# Patient Record
Sex: FEMALE | Race: WHITE | Employment: UNEMPLOYED | URBAN - METROPOLITAN AREA
[De-identification: names, ages, dates, MRNs, and addresses within clinical notes are randomized per-mention and may not be internally consistent; named-entity substitution may affect disease eponyms.]

---

## 2017-07-06 PROCEDURE — 88175 CYTOPATH C/V AUTO FLUID REDO: CPT | Performed by: OBSTETRICS & GYNECOLOGY

## 2017-07-06 PROCEDURE — 87624 HPV HI-RISK TYP POOLED RSLT: CPT | Performed by: OBSTETRICS & GYNECOLOGY

## 2017-12-29 PROBLEM — J45.41: Status: ACTIVE | Noted: 2017-12-29

## 2018-05-24 PROBLEM — E55.9 VITAMIN D INSUFFICIENCY: Status: ACTIVE | Noted: 2018-05-24

## 2018-08-06 PROBLEM — Z87.410 HISTORY OF CERVICAL DYSPLASIA: Status: ACTIVE | Noted: 2018-08-06

## 2018-08-06 PROCEDURE — 88175 CYTOPATH C/V AUTO FLUID REDO: CPT | Performed by: OBSTETRICS & GYNECOLOGY

## 2018-08-30 PROBLEM — M25.511 CHRONIC RIGHT SHOULDER PAIN: Status: ACTIVE | Noted: 2018-08-30

## 2018-08-30 PROBLEM — G89.29 CHRONIC RIGHT SHOULDER PAIN: Status: ACTIVE | Noted: 2018-08-30

## 2019-09-04 PROCEDURE — 88175 CYTOPATH C/V AUTO FLUID REDO: CPT | Performed by: OBSTETRICS & GYNECOLOGY

## 2020-10-15 PROBLEM — G89.29 CHRONIC RIGHT SHOULDER PAIN: Status: RESOLVED | Noted: 2018-08-30 | Resolved: 2020-10-15

## 2020-10-15 PROBLEM — M25.511 CHRONIC RIGHT SHOULDER PAIN: Status: RESOLVED | Noted: 2018-08-30 | Resolved: 2020-10-15

## 2024-05-31 ENCOUNTER — TELEPHONE (OUTPATIENT)
Dept: ORTHOPEDICS CLINIC | Facility: CLINIC | Age: 44
End: 2024-05-31

## 2024-05-31 DIAGNOSIS — M25.512 LEFT SHOULDER PAIN, UNSPECIFIED CHRONICITY: Primary | ICD-10-CM

## 2024-05-31 NOTE — TELEPHONE ENCOUNTER
Xr ordered for upcoming appointment    Called patient to inform her an Xr order was ordered and scheduled     Patient aware to arrive 15-20 minutes earlier to complete images before seeing the provider     Received no answer   Patient last active 12/2022 on my-cart     Please try reaching out to patient again before appointment date     Xr already scheduled

## 2024-05-31 NOTE — TELEPHONE ENCOUNTER
Pt is seeing Dr. Glasgow for left shoulder pain. Please advise if imaging is needed.  Future Appointments   Date Time Provider Department Center   6/10/2024  1:00 PM Segun Glasgow,  [facility] [address]

## 2024-06-03 NOTE — TELEPHONE ENCOUNTER
LMOM asking patient to contact our office to confirm which appointment she wants to keep and to let her know that xray has been ordered.

## 2024-06-10 ENCOUNTER — OFFICE VISIT (OUTPATIENT)
Facility: CLINIC | Age: 44
End: 2024-06-10
Payer: COMMERCIAL

## 2024-06-10 ENCOUNTER — HOSPITAL ENCOUNTER (OUTPATIENT)
Dept: GENERAL RADIOLOGY | Age: 44
Discharge: HOME | End: 2024-06-10
Attending: FAMILY MEDICINE
Payer: COMMERCIAL

## 2024-06-10 VITALS — BODY MASS INDEX: 19.62 KG/M2 | WEIGHT: 125 LBS | HEIGHT: 67 IN

## 2024-06-10 DIAGNOSIS — M75.102 ROTATOR CUFF SYNDROME OF LEFT SHOULDER: Primary | ICD-10-CM

## 2024-06-10 DIAGNOSIS — M25.512 LEFT SHOULDER PAIN, UNSPECIFIED CHRONICITY: ICD-10-CM

## 2024-06-10 PROCEDURE — 3008F BODY MASS INDEX DOCD: CPT | Performed by: FAMILY MEDICINE

## 2024-06-10 PROCEDURE — 73030 X-RAY EXAM OF SHOULDER: CPT | Performed by: FAMILY MEDICINE

## 2024-06-10 PROCEDURE — 99204 OFFICE O/P NEW MOD 45 MIN: CPT | Performed by: FAMILY MEDICINE

## 2024-06-10 RX ORDER — DICLOFENAC SODIUM 75 MG/1
75 TABLET, DELAYED RELEASE ORAL 2 TIMES DAILY
Qty: 28 TABLET | Refills: 1 | Status: SHIPPED | OUTPATIENT
Start: 2024-06-10

## 2024-06-14 NOTE — H&P
Sports Medicine Clinic Note     Subjective:    Chief Complaint: Left shoulder pain.    History of Present Illness: This is a pleasant 43 year old patient who presents with shoulder pain, left side, with a duration of several months. Reports discomfort and weakness, exacerbated by overhead activities and certain movements.The patient describes a gradual worsening of her pain since around February/March of 2024 when she was doing a lot of free weight training. Pain is localized around the shoulder, worsened by lifting objects, reaching overhead, and during the night. Denies any systemic symptoms, or previous surgeries to this shoulder. No numbness or tingling in the extremities reported. She has been doing PT but has avoided PO analgesics. No previous injections.    Objective:    Left Shoulder Examination:    Inspection:  No gross asymmetry / loss of contour / gross deformity  No evidence of muscle atrophy    Palpation:  No tenderness to palpation at AC joint, subacromial bursa, biceps tendon, or greater tuberosity    ROM:  AROM: FF 0-160, Abduction 0-160, External rotation 50, Internal rotation to mid thoracic back  PROM: FF 0-160, Abduction 0-160, External rotation 50, Internal rotation to mid thoracic back    Neurovascular:  SILT axillary / radial / ulnar / median / musculocutaneous nerve distributions  Fires biceps / triceps / deltoid  2+ radial pulse, brisk capillary refill    Special Tests:  Positive Neer (impingement)  Positive Ricketts (impingement)  Positive Empty Can (supraspinatus)  Positive Lift Off (subscapularis)  Negative Drop Arm (supraspinatus)  Negative ER Lag (infraspinatous/teres minor)  Negative IR Lag (subscapularis)  Negative Scarf (acromioclavicular)  Negative Yergason (biceps)  Negative Speed's (biceps)  Negative O’colby’s (labrum)  Negative Apprehension (labrum)  Negative Spurling (cervical radiculopathy)    Diagnostic Studies:    Radiographs of the affected shoulder: Personally reviewed. No  apparent fracture or dislocation. No significant osteophyte formation or joint space narrowing.    Assessment:    Rotator Cuff Syndrome, Left    Plan:    Further Imaging: No further imaging needed at this time. May recommend MRI of the shoulder in the future to evaluate for shoulder derangement if patient fails conservative management, deferred for time being.  Physical Therapy: Continue physical therapy focusing on rotator cuff strengthening and range of motion exercises.  Over-the-Counter Medications: Advise the use of acetaminophen for pain control.  Prescription Medications: Prescribe Diclofenac to be taken as needed for pain and inflammation, she prefers to try this first before considering injections or further intervention which is reasonable.  Activity Recommendations: Advise the patient to avoid activities that exacerbate symptoms, particularly overhead activities.    Follow-up: Schedule a follow-up visit in 4-6 weeks to reassess symptoms. A corticosteroid injection is a potential next step, to be evaluated during follow-up.         Segun Glasgow DO, CAQSM   Primary Care Sports Medicine    Department of Orthopaedic Surgery  [facility]    [address] [place] [id number]  [address] [place] [id number]    t: [phone]  f: [phone]      [facility]